# Patient Record
(demographics unavailable — no encounter records)

---

## 2024-11-27 NOTE — HISTORY OF PRESENT ILLNESS
[To Bed: ___] : ~he/she~ goes to bed at [unfilled] [Arises: ___] : arises at [unfilled] [Sleep Onset Latency: ___ minutes] : sleep onset latency of [unfilled] minutes reported [Nocturnal Awakenings: ___] : ~he/she~ typically has [unfilled] nocturnal awakenings [WASO: ___] : Wake time after sleep onset is [unfilled] [Daytime Sleep: ___] : daytime sleep: [unfilled] [FreeTextEntry1] : Mr. Chopra is 51-year-old male with severe ANDREE on CPAP therapy who presents to the office for follow up.  PMHx: HTN, Hyperparathyroidism s/p parathyroidectomy, BPH  2023 HST: severe ANDREE AHI 47.5/hr T 90% 11% 2/3/2023 APAP titration study: Insufficient usage time.  2023 CPAP titration study: The optimal CPAP setting was 14 CMH20.   [place]: Airsense 11 Autoset setup on 2023 by DME: [facility]  Patient reports he is compliant with CPAP therapy and feels benefit. He reports resolution of sleep apnea symptoms.  Patient is currently using FFM. He is tolerating mask and pressure well.   Medication list updated. Patient endorses he gained 30 lbs since .   EPWORTH SLEEPINESS SCALE How likely are you to doze off or fall asleep in the situations described below, in contrast to feeling just tired? This refers to your usual way of life in recent times. Even if you haven't done some of these things recently, try to work out how they would have affected you. Use the following scale to choose one most appropriate number for each situation.......Chance of dozin= never. 1= slight. 2= moderate. 3= high. CHANCE OF DOZING............SITUATION 0.............................................Sitting and reading 0.............................................Watching TV 0.............................................Sitting inactive in a public place (eg a theatre or a meeting) 1.............................................As a passenger in a car for an hour without a break 0.............................................Lying down to rest in the afternoon when circumstances permit 0.............................................Sitting and talking to someone 1.............................................Sitting quietly after lunch without alcohol 0.............................................In a car, while stopped for a few minutes in traffic  2............................................TOTAL ESS SCORE

## 2024-11-27 NOTE — REVIEW OF SYSTEMS
[Recent Wt Gain (___ Lbs)] : recent [unfilled] ~Ulb weight gain [Obesity] : obesity [Negative] : Psychiatric

## 2024-11-27 NOTE — PHYSICAL EXAM
[General Appearance - Well Developed] : well developed [Normal Appearance] : normal appearance [Normal Conjunctiva] : the conjunctiva exhibited no abnormalities [Heart Rate And Rhythm] : heart rate was normal and rhythm regular [Heart Sounds] : normal S1 and S2 [] : no respiratory distress [Auscultation Breath Sounds / Voice Sounds] : lungs were clear to auscultation bilaterally [Oriented To Time, Place, And Person] : oriented to person, place, and time [Mood] : the mood was normal

## 2024-11-27 NOTE — ASSESSMENT
[FreeTextEntry1] : Mr. Chopra is 51-year-old male with severe ANDREE on CPAP therapy who presents to the office for follow up.  PMHx: HTN, Hyperparathyroidism s/p parathyroidectomy, BPH  Discussed with patient CPAP compliance data: Compliant 100% of days, 100% for >4-hrs, average 7hrs 19mins. AHI 0.1/hr Leaks 8.8L/m. Patient is deriving benefit from CPAP therapy.   Counseled on the importance of weight loss and its positive impact on the severity of sleep apnea. PAP resupply to continue therapy at the current settings. Annual follow-up, sooner if needed.